# Patient Record
Sex: MALE | Race: WHITE | Employment: FULL TIME | URBAN - METROPOLITAN AREA
[De-identification: names, ages, dates, MRNs, and addresses within clinical notes are randomized per-mention and may not be internally consistent; named-entity substitution may affect disease eponyms.]

---

## 2018-11-15 ENCOUNTER — OFFICE VISIT (OUTPATIENT)
Dept: FAMILY MEDICINE CLINIC | Facility: CLINIC | Age: 18
End: 2018-11-15
Payer: COMMERCIAL

## 2018-11-15 VITALS
BODY MASS INDEX: 44.59 KG/M2 | HEART RATE: 106 BPM | WEIGHT: 315 LBS | SYSTOLIC BLOOD PRESSURE: 146 MMHG | HEIGHT: 70.5 IN | DIASTOLIC BLOOD PRESSURE: 87 MMHG | TEMPERATURE: 98 F

## 2018-11-15 DIAGNOSIS — N50.89 TESTICULAR MASS: Primary | ICD-10-CM

## 2018-11-15 PROCEDURE — 99212 OFFICE O/P EST SF 10 MIN: CPT | Performed by: FAMILY MEDICINE

## 2018-11-15 PROCEDURE — 99214 OFFICE O/P EST MOD 30 MIN: CPT | Performed by: FAMILY MEDICINE

## 2018-11-15 NOTE — PROGRESS NOTES
HPI:    Patient ID: Sharon Min is a 19year old male. HPI  Patient presents with:  Lump: patient c/o lump on testicle  Has noticed this over the past 2 months. Review of Systems   Constitutional: Negative.     Genitourinary: Positive for testicu

## 2018-12-14 ENCOUNTER — HOSPITAL ENCOUNTER (OUTPATIENT)
Dept: ULTRASOUND IMAGING | Age: 18
Discharge: HOME | End: 2018-12-14
Attending: FAMILY MEDICINE
Payer: COMMERCIAL

## 2018-12-14 DIAGNOSIS — N50.89 TESTICULAR MASS: ICD-10-CM

## 2018-12-14 PROCEDURE — 93975 VASCULAR STUDY: CPT | Performed by: FAMILY MEDICINE

## 2018-12-14 PROCEDURE — 76870 US EXAM SCROTUM: CPT | Performed by: FAMILY MEDICINE

## 2019-06-14 ENCOUNTER — OFFICE VISIT (OUTPATIENT)
Dept: FAMILY MEDICINE CLINIC | Facility: CLINIC | Age: 19
End: 2019-06-14
Payer: COMMERCIAL

## 2019-06-14 VITALS
TEMPERATURE: 98 F | DIASTOLIC BLOOD PRESSURE: 60 MMHG | HEART RATE: 94 BPM | WEIGHT: 315 LBS | SYSTOLIC BLOOD PRESSURE: 93 MMHG | BODY MASS INDEX: 44.59 KG/M2 | HEIGHT: 70.5 IN

## 2019-06-14 DIAGNOSIS — J01.00 ACUTE MAXILLARY SINUSITIS, RECURRENCE NOT SPECIFIED: Primary | ICD-10-CM

## 2019-06-14 PROCEDURE — 99213 OFFICE O/P EST LOW 20 MIN: CPT | Performed by: FAMILY MEDICINE

## 2019-06-14 PROCEDURE — 99212 OFFICE O/P EST SF 10 MIN: CPT | Performed by: FAMILY MEDICINE

## 2019-06-14 RX ORDER — AMOXICILLIN AND CLAVULANATE POTASSIUM 875; 125 MG/1; MG/1
1 TABLET, FILM COATED ORAL 2 TIMES DAILY
Qty: 14 TABLET | Refills: 0 | Status: SHIPPED | OUTPATIENT
Start: 2019-06-14 | End: 2019-06-21

## 2019-06-14 NOTE — PROGRESS NOTES
HPI:    Patient ID: Celeste Tomlinson. is a 19year old male. HPI  Patient presents with:  Cold: congestion head chest, chills, hard to breath, not sure if fever     Review of Systems   Constitutional: Positive for fatigue.    HENT: Positive for conges

## 2020-10-29 ENCOUNTER — NURSE TRIAGE (OUTPATIENT)
Dept: FAMILY MEDICINE CLINIC | Facility: CLINIC | Age: 20
End: 2020-10-29

## 2020-10-29 ENCOUNTER — TELEMEDICINE (OUTPATIENT)
Dept: FAMILY MEDICINE CLINIC | Facility: CLINIC | Age: 20
End: 2020-10-29
Payer: COMMERCIAL

## 2020-10-29 DIAGNOSIS — Z20.822 SUSPECTED COVID-19 VIRUS INFECTION: Primary | ICD-10-CM

## 2020-10-29 PROCEDURE — 99213 OFFICE O/P EST LOW 20 MIN: CPT | Performed by: PHYSICIAN ASSISTANT

## 2020-10-29 RX ORDER — AZITHROMYCIN 250 MG/1
TABLET, FILM COATED ORAL
Qty: 6 TABLET | Refills: 0 | Status: SHIPPED | OUTPATIENT
Start: 2020-10-29 | End: 2020-11-03

## 2020-10-29 NOTE — PROGRESS NOTES
Please note that the following visit was completed using two-way, real-time interactive audio and/or video communication.   This has been done in good suleman to provide continuity of care in the best interest of the provider-patient relationship, due to the The patient was made aware of where to find [facility] notice of privacy practices, telehealth consent form and other related consent forms and documents. which are located on the [facility] website.  The patient verbally agreed to telehealth consent form, related c disorder)          PHYSICAL EXAM:     Vitals signs taken at home if available:    Limited examination for this telephone visit due to the coronavirus emergency    Patient was speaking in complete sentences, no increased work of breathing and very coherent

## 2020-10-29 NOTE — TELEPHONE ENCOUNTER
Action Requested: Summary for Provider     []  Critical Lab, Recommendations Needed  [] Need Additional Advice  []   FYI    []   Need Orders  [] Need Medications Sent to Pharmacy  []  Other     SUMMARY: Patient reports intermittent symptoms of cold/cough/c

## 2020-10-30 ENCOUNTER — APPOINTMENT (OUTPATIENT)
Dept: LAB | Age: 20
End: 2020-10-30
Attending: PHYSICIAN ASSISTANT
Payer: COMMERCIAL

## 2020-10-30 DIAGNOSIS — Z20.822 SUSPECTED COVID-19 VIRUS INFECTION: ICD-10-CM

## 2022-04-13 ENCOUNTER — TELEPHONE (OUTPATIENT)
Dept: FAMILY MEDICINE CLINIC | Facility: CLINIC | Age: 22
End: 2022-04-13

## 2022-04-13 NOTE — TELEPHONE ENCOUNTER
Patient requesting orders for labs in regards to diabetes. Patient requesting a call back with any fasting instructions, and if this can be done prior to his appointment or day of.

## 2022-04-15 ENCOUNTER — OFFICE VISIT (OUTPATIENT)
Dept: FAMILY MEDICINE CLINIC | Facility: CLINIC | Age: 22
End: 2022-04-15
Payer: COMMERCIAL

## 2022-04-15 ENCOUNTER — LAB ENCOUNTER (OUTPATIENT)
Dept: LAB | Age: 22
End: 2022-04-15
Attending: FAMILY MEDICINE
Payer: COMMERCIAL

## 2022-04-15 VITALS
HEART RATE: 93 BPM | BODY MASS INDEX: 44.59 KG/M2 | DIASTOLIC BLOOD PRESSURE: 77 MMHG | HEIGHT: 70.5 IN | WEIGHT: 315 LBS | SYSTOLIC BLOOD PRESSURE: 124 MMHG

## 2022-04-15 DIAGNOSIS — E11.9 TYPE 2 DIABETES MELLITUS WITHOUT COMPLICATION, WITHOUT LONG-TERM CURRENT USE OF INSULIN (HCC): ICD-10-CM

## 2022-04-15 DIAGNOSIS — G47.33 OSA ON CPAP: ICD-10-CM

## 2022-04-15 DIAGNOSIS — Z00.00 ROUTINE GENERAL MEDICAL EXAMINATION AT A HEALTH CARE FACILITY: ICD-10-CM

## 2022-04-15 DIAGNOSIS — R73.09 ELEVATED GLUCOSE: Primary | ICD-10-CM

## 2022-04-15 DIAGNOSIS — Z99.89 OSA ON CPAP: ICD-10-CM

## 2022-04-15 DIAGNOSIS — Z00.00 ROUTINE GENERAL MEDICAL EXAMINATION AT A HEALTH CARE FACILITY: Primary | ICD-10-CM

## 2022-04-15 DIAGNOSIS — E66.01 CLASS 3 SEVERE OBESITY DUE TO EXCESS CALORIES WITHOUT SERIOUS COMORBIDITY WITH BODY MASS INDEX (BMI) OF 60.0 TO 69.9 IN ADULT (HCC): ICD-10-CM

## 2022-04-15 LAB
ALBUMIN SERPL-MCNC: 3.9 G/DL (ref 3.4–5)
ALBUMIN/GLOB SERPL: 1 {RATIO} (ref 1–2)
ALP LIVER SERPL-CCNC: 79 U/L
ALT SERPL-CCNC: 45 U/L
ANION GAP SERPL CALC-SCNC: 7 MMOL/L (ref 0–18)
AST SERPL-CCNC: 17 U/L (ref 15–37)
BASOPHILS # BLD AUTO: 0.09 X10(3) UL (ref 0–0.2)
BASOPHILS NFR BLD AUTO: 1.4 %
BILIRUB SERPL-MCNC: 0.5 MG/DL (ref 0.1–2)
BUN BLD-MCNC: 19 MG/DL (ref 7–18)
BUN/CREAT SERPL: 19 (ref 10–20)
CALCIUM BLD-MCNC: 9.4 MG/DL (ref 8.5–10.1)
CARTRIDGE LOT#: ABNORMAL NUMERIC
CHLORIDE SERPL-SCNC: 108 MMOL/L (ref 98–112)
CHOLEST SERPL-MCNC: 223 MG/DL (ref ?–200)
CO2 SERPL-SCNC: 24 MMOL/L (ref 21–32)
CREAT BLD-MCNC: 1 MG/DL
CREAT UR-SCNC: 243 MG/DL
DEPRECATED RDW RBC AUTO: 37.2 FL (ref 35.1–46.3)
EOSINOPHIL # BLD AUTO: 0.22 X10(3) UL (ref 0–0.7)
EOSINOPHIL NFR BLD AUTO: 3.5 %
ERYTHROCYTE [DISTWIDTH] IN BLOOD BY AUTOMATED COUNT: 12 % (ref 11–15)
FASTING PATIENT LIPID ANSWER: YES
FASTING STATUS PATIENT QL REPORTED: YES
GLOBULIN PLAS-MCNC: 3.9 G/DL (ref 2.8–4.4)
GLUCOSE BLD-MCNC: 168 MG/DL (ref 70–99)
HCT VFR BLD AUTO: 44.4 %
HDLC SERPL-MCNC: 42 MG/DL (ref 40–59)
HEMOGLOBIN A1C: 7.9 % (ref 4.3–5.6)
HGB BLD-MCNC: 14.7 G/DL
IMM GRANULOCYTES # BLD AUTO: 0.02 X10(3) UL (ref 0–1)
IMM GRANULOCYTES NFR BLD: 0.3 %
LDLC SERPL CALC-MCNC: 156 MG/DL (ref ?–100)
LYMPHOCYTES # BLD AUTO: 2.06 X10(3) UL (ref 1–4)
LYMPHOCYTES NFR BLD AUTO: 32.5 %
MCH RBC QN AUTO: 28.2 PG (ref 26–34)
MCHC RBC AUTO-ENTMCNC: 33.1 G/DL (ref 31–37)
MCV RBC AUTO: 85.2 FL
MICROALBUMIN UR-MCNC: 4.28 MG/DL
MICROALBUMIN/CREAT 24H UR-RTO: 17.6 UG/MG (ref ?–30)
MONOCYTES # BLD AUTO: 0.45 X10(3) UL (ref 0.1–1)
MONOCYTES NFR BLD AUTO: 7.1 %
NEUTROPHILS # BLD AUTO: 3.5 X10 (3) UL (ref 1.5–7.7)
NEUTROPHILS # BLD AUTO: 3.5 X10(3) UL (ref 1.5–7.7)
NEUTROPHILS NFR BLD AUTO: 55.2 %
NONHDLC SERPL-MCNC: 181 MG/DL (ref ?–130)
OSMOLALITY SERPL CALC.SUM OF ELEC: 294 MOSM/KG (ref 275–295)
PLATELET # BLD AUTO: 344 10(3)UL (ref 150–450)
POTASSIUM SERPL-SCNC: 4.2 MMOL/L (ref 3.5–5.1)
PROT SERPL-MCNC: 7.8 G/DL (ref 6.4–8.2)
RBC # BLD AUTO: 5.21 X10(6)UL
SODIUM SERPL-SCNC: 139 MMOL/L (ref 136–145)
TRIGL SERPL-MCNC: 140 MG/DL (ref 30–149)
VLDLC SERPL CALC-MCNC: 27 MG/DL (ref 0–30)
WBC # BLD AUTO: 6.3 X10(3) UL (ref 4–11)

## 2022-04-15 PROCEDURE — 80061 LIPID PANEL: CPT

## 2022-04-15 PROCEDURE — 3051F HG A1C>EQUAL 7.0%<8.0%: CPT | Performed by: FAMILY MEDICINE

## 2022-04-15 PROCEDURE — 99395 PREV VISIT EST AGE 18-39: CPT | Performed by: FAMILY MEDICINE

## 2022-04-15 PROCEDURE — 80053 COMPREHEN METABOLIC PANEL: CPT | Performed by: FAMILY MEDICINE

## 2022-04-15 PROCEDURE — 3008F BODY MASS INDEX DOCD: CPT | Performed by: FAMILY MEDICINE

## 2022-04-15 PROCEDURE — 99213 OFFICE O/P EST LOW 20 MIN: CPT | Performed by: FAMILY MEDICINE

## 2022-04-15 PROCEDURE — 3078F DIAST BP <80 MM HG: CPT | Performed by: FAMILY MEDICINE

## 2022-04-15 PROCEDURE — 36415 COLL VENOUS BLD VENIPUNCTURE: CPT | Performed by: FAMILY MEDICINE

## 2022-04-15 PROCEDURE — 85025 COMPLETE CBC W/AUTO DIFF WBC: CPT | Performed by: FAMILY MEDICINE

## 2022-04-15 PROCEDURE — 82043 UR ALBUMIN QUANTITATIVE: CPT | Performed by: FAMILY MEDICINE

## 2022-04-15 PROCEDURE — 3074F SYST BP LT 130 MM HG: CPT | Performed by: FAMILY MEDICINE

## 2022-04-15 PROCEDURE — 3061F NEG MICROALBUMINURIA REV: CPT | Performed by: FAMILY MEDICINE

## 2022-04-15 PROCEDURE — 82570 ASSAY OF URINE CREATININE: CPT | Performed by: FAMILY MEDICINE

## 2022-04-15 PROCEDURE — 83036 HEMOGLOBIN GLYCOSYLATED A1C: CPT | Performed by: FAMILY MEDICINE

## 2022-04-15 NOTE — PROGRESS NOTES
Subjective:   Patient ID: Martine Bethea. is a 19year old male. HPI  Patient presents with:  Physical: needs diabetes check for DOT. has been trying to lose weight   Derm Problem: lump on back, has had for years. not painful     History/Other:    Patient Active Problem List:     Acquired acanthosis nigricans     Attention deficit disorder with hyperactivity     Obesity    Review of Systems   Constitutional: Negative. HENT: Negative. Eyes: Negative. Respiratory: Negative. Cardiovascular: Negative. Gastrointestinal: Negative. Genitourinary: Negative. Musculoskeletal: Negative. Skin: Negative. Neurological: Negative. Psychiatric/Behavioral: Negative. No current outpatient medications on file. Allergies:  Artichoke               HIVES    Objective:   Physical Exam  Vitals reviewed. Constitutional:       Appearance: He is well-developed. He is obese. HENT:      Head: Normocephalic and atraumatic. Right Ear: External ear normal.      Left Ear: External ear normal.      Nose: Nose normal.   Eyes:      Conjunctiva/sclera: Conjunctivae normal.      Pupils: Pupils are equal, round, and reactive to light. Cardiovascular:      Rate and Rhythm: Normal rate and regular rhythm. Heart sounds: Normal heart sounds. Pulmonary:      Effort: Pulmonary effort is normal.      Breath sounds: Normal breath sounds. Abdominal:      General: Bowel sounds are normal.      Palpations: Abdomen is soft. Musculoskeletal:         General: Normal range of motion. Cervical back: Normal range of motion and neck supple. Skin:     General: Skin is warm and dry. Findings: No rash. Neurological:      Mental Status: He is alert and oriented to person, place, and time. Deep Tendon Reflexes: Reflexes are normal and symmetric. Psychiatric:         Mood and Affect: Mood is not anxious or depressed. Last A1c value was 7.9% done 4/15/2022.     Assessment & Plan: Elevated glucose  (primary encounter diagnosis)  Routine general medical examination at a health care facility  Type 2 diabetes mellitus without complication, without long-term current use of insulin (HCC)  ОЛЬГА on CPAP  Class 3 severe obesity due to excess calories without serious comorbidity with body mass index (BMI) of 60.0 to 69.9 in adult [facility])    Labs orderd. Start diabetic education new diagnosis of DM  Uses CPAP regularly. Discussed health aspects of his weight  Follow up in 2 months.      Orders Placed This Encounter      POC Glycohemoglobin [[id number]]      CBC With Differential With Platelet      Comp Metabolic Panel (14)      Microalb/Creat Ratio, Random Urine      Meds This Visit:  Requested Prescriptions      No prescriptions requested or ordered in this encounter       Imaging & Referrals:  DIABETIC EDUCATION - INTERNAL

## 2022-04-15 NOTE — PROGRESS NOTES
Subjective:   Patient ID: Anila Mehta. is a 19year old male. HPI  Patient presents with:  Physical: needs diabetes check for DOT. has been trying to lose weight   Derm Problem: lump on back, has had for years. not painful     History/Other:   Review of Systems   Constitutional: Negative. HENT: Negative. Eyes: Negative. Respiratory: Negative. Cardiovascular: Negative. Gastrointestinal: Negative. Genitourinary: Negative. Musculoskeletal: Negative. Skin: Negative. Neurological: Negative. Psychiatric/Behavioral: Negative. No current outpatient medications on file. Allergies:  Artichoke               HIVES    Objective:   Physical Exam  Vitals reviewed. Constitutional:       Appearance: He is well-developed. He is obese. HENT:      Head: Normocephalic and atraumatic. Right Ear: External ear normal.      Left Ear: External ear normal.      Nose: Nose normal.   Eyes:      Conjunctiva/sclera: Conjunctivae normal.      Pupils: Pupils are equal, round, and reactive to light. Cardiovascular:      Rate and Rhythm: Normal rate and regular rhythm. Heart sounds: Normal heart sounds. Pulmonary:      Effort: Pulmonary effort is normal.      Breath sounds: Normal breath sounds. Abdominal:      General: Bowel sounds are normal.      Palpations: Abdomen is soft. Musculoskeletal:         General: Normal range of motion. Cervical back: Normal range of motion and neck supple. Skin:     General: Skin is warm and dry. Findings: No rash. Neurological:      Mental Status: He is alert and oriented to person, place, and time. Deep Tendon Reflexes: Reflexes are normal and symmetric. Psychiatric:         Mood and Affect: Mood is not anxious or depressed.          Assessment & Plan:   Elevated glucose  (primary encounter diagnosis)  Routine general medical examination at a health care facility  Type 2 diabetes mellitus without complication, without long-term current use of insulin (HCC)  ОЛЬГА on CPAP    Orders Placed This Encounter      POC Glycohemoglobin [[id number]]      CBC With Differential With Platelet      Comp Metabolic Panel (14)      Microalb/Creat Ratio, Random Urine      Meds This Visit:  Requested Prescriptions      No prescriptions requested or ordered in this encounter       Imaging & Referrals:  DIABETIC EDUCATION - INTERNAL

## 2022-04-22 ENCOUNTER — HOSPITAL ENCOUNTER (OUTPATIENT)
Dept: ENDOCRINOLOGY | Facility: HOSPITAL | Age: 22
Discharge: HOME | End: 2022-04-22
Attending: FAMILY MEDICINE
Payer: COMMERCIAL

## 2022-04-22 VITALS — WEIGHT: 315 LBS | BODY MASS INDEX: 60 KG/M2

## 2022-04-22 DIAGNOSIS — E11.65 TYPE 2 DIABETES MELLITUS WITH HYPERGLYCEMIA, WITHOUT LONG-TERM CURRENT USE OF INSULIN (HCC): Primary | ICD-10-CM

## 2022-04-22 NOTE — PROGRESS NOTES
Isabella Márquez. : 11/10/2000 attended individual initial assessment for Diabetes Education:    Date: 2022   Start time: 3:30 pm End time: 4:30     HEMOGLOBIN A1C (%)   Date Value   04/15/2022 7.9 (A)        Assessment: 20 y/o male presents for DSMT education, he is motivated to lower AIC as well as losing weight     Diet Hx: B:cereal or a burger  L: fast food  D: Dio with meat and some starch  Has sufficient H20    Education provided on the below topics:     Diabetes Overview, pathophysiology, A1C results and treatment options for diabetes self-management:   Described basic process and treatment options for diabetes self-management. Healthy Eating:  Obtained usual diet history. Instructed on Basic Diet Guidelines which includes eating 3 meals/day with HS snack. Eat moderate amounts at consistent times from day to day. Limit/avoid sweets. Being Active: Encouraged Physical Activity    Taking Medications: Reviewed current diabetes medications (if applicable)    Monitoring: Instructed/reinforced blood glucose monitoring, testing schedules and target goals:   Fasting / Pre-meal  mg/dL 2 Hour Post-prandial <180 mg/dL  Demonstrated ability to perform blood glucose testing. Problem Solving: Prevention, detection and treatment of acute complications: taught symptoms of hypoglycemia, hyperglycemia, how to treat low blood sugar (Rule of 15) and actions for lowering high blood glucose levels. Initiated goal setting process and will continue to refine throughout class series. Recommendations:      Monitor blood glucose as directed. Follow Basic Diet Guidelines. Requesting order for 1:1 classes due to work schedule     Prescriptions sent to preferred pharmacy for blood glucose meter, test strips and lancets per protocol. Written materials provided for all areas covered. Patient verbalized understanding and has no further questions at this time.     Sailaja Wayne, RN

## 2022-04-29 ENCOUNTER — HOSPITAL ENCOUNTER (OUTPATIENT)
Dept: ENDOCRINOLOGY | Facility: HOSPITAL | Age: 22
Discharge: HOME | End: 2022-04-29
Attending: FAMILY MEDICINE
Payer: COMMERCIAL

## 2022-04-29 VITALS — BODY MASS INDEX: 60 KG/M2 | WEIGHT: 315 LBS

## 2022-04-29 DIAGNOSIS — E11.9 TYPE 2 DIABETES MELLITUS WITHOUT COMPLICATION, WITHOUT LONG-TERM CURRENT USE OF INSULIN (HCC): Primary | ICD-10-CM

## 2022-04-29 NOTE — PROGRESS NOTES
Elliott Mcneill. [id number] attended Steps 2 & 3 Individual Diabetes Education Class:1:1    Date: 4/29/2022  Start time:2:15  End time: 3:15    Wt: Wt Readings from Last 1 Encounters:  04/22/22 : (!) 425 lb        Blood Glucose: Not controlled: Progressing toward goal Will start to check sugars tommorow, just picked up glucometer and supplies      Healthy Eating:  Reviewed Basic Diet Guidelines as foundation of diabetes meal planning. Reinforced the balanced plate method. Taught nutrition basics defining carbohydrate, protein, and fat. Taught label reading, including an option to count carbohydrate grams or servings. Provided patient with goals for carbohydrate grams/choices for meals and snacks. Discussed sugar substitutes, ETOH and effect on blood glucose. Jose D's helpful for smart phones, as well as websites for examining carbohydrate levels provided. Reviewed and reinforced macronutrient's, carbohydrate counting and meal planning. Problem Solving:  Reinforced signs, symptoms, and treatment of hypoglycemia using the Rule of 15. Discussed impact of different food items and portion sizes on blood glucose levels. Discussed blood glucose target of 180 mg/dL, if testing 2 hours post meal.    Monitoring:  Reviewed blood glucose records and importance of tracking foods/blood glucose levels. Reinforced the importance of taking medications as prescribed. Behavior Change:  Reviewed and updated individual goals and action plan set by patient. Addressed barriers to tracking foods/blood glucose levels and following guidelines presented/achieving goals, as a group discussion. Recommendations: Follow individual meal plan recommended by Educator (Leo Montelongo). Continue implementing individual goals. Begin implementing carbohydrate counting and continue dietary and blood glucose tracking. Written materials provided for all areas covered.     Patient verbalized understanding and has no further questions currently     Danna Clark RN,MSN

## 2022-05-27 ENCOUNTER — APPOINTMENT (OUTPATIENT)
Dept: ENDOCRINOLOGY | Age: 22
End: 2022-05-27
Attending: FAMILY MEDICINE
Payer: COMMERCIAL

## 2022-06-10 ENCOUNTER — HOSPITAL ENCOUNTER (OUTPATIENT)
Dept: ENDOCRINOLOGY | Age: 22
Discharge: HOME | End: 2022-06-10
Attending: FAMILY MEDICINE
Payer: COMMERCIAL

## 2022-06-10 NOTE — PROGRESS NOTES
Bruce Leventhal. [id number] attended Step 4 Group Diabetes Education Class: 1;1   Date: 6/10/2022  Start time: 3:15 End time: 4:15     Wt: Wt Readings from Last 1 Encounters:  04/29/22 : (!) 425 lb    Weight change since start of program: lost 10 lbs    Blood Glucose: Not controlled: Progressing toward goal. Patient forgets to check BG but will     Healthy Eating  Reviewed and reinforced macronutrients, carbohydrate counting, and meal planning. Reviewed meal planning methods. Taught principles of heart healthy eating (fats, cholesterol, fiber, and sodium intake). Discussed Mediterranean meal planning. Taught label guidelines for choosing fat controlled proteins, snacks and desserts. Discussed how to integrate information for meal planning. Medication   Guidelines for cholesterol medications for persons with Diabetes. Healthy Coping  Instructed on developing and implementing a support plan. Discussed avoiding Diabetes burnout, dealing with stress and stress reduction techniques. Discussed recognizing and dealing with depression and diabetes. Being Active  Reinforced the benefits of exercise and safety precautions. Discussed the effects of blood glucose levels and role in weight loss. Discussed strategies for increasing activity and maintaining regular exercise regimen. Problem Solving  List of community resources provided and discussed. Behavior Change  Reviewed and updated individual goals set by patient. Action plan completed and Diabetes support plan initiated    Patient has identified the following goal(s) and actions related to: Healthy Eating, Being Active and Monitoring Blood Glucose  Diabetes goal assessment and action plan scanned into Media  Bruce Leventhal. has chosen the following ongoing Diabetes Support Plan: Exercise programs and The use of Diabetes Websites or Apps    Written materials provided for all areas covered.     Patient verbalized understanding and has no further questions at this time.  Patient will meet with educator in one month for Step 966 Darlene Perkins, RN,MSN

## 2022-06-24 ENCOUNTER — OFFICE VISIT (OUTPATIENT)
Dept: FAMILY MEDICINE CLINIC | Facility: CLINIC | Age: 22
End: 2022-06-24
Payer: COMMERCIAL

## 2022-06-24 VITALS
SYSTOLIC BLOOD PRESSURE: 133 MMHG | BODY MASS INDEX: 44.59 KG/M2 | HEART RATE: 120 BPM | HEIGHT: 70.5 IN | DIASTOLIC BLOOD PRESSURE: 74 MMHG | WEIGHT: 315 LBS

## 2022-06-24 DIAGNOSIS — E11.9 TYPE 2 DIABETES MELLITUS WITHOUT COMPLICATION, WITHOUT LONG-TERM CURRENT USE OF INSULIN (HCC): Primary | ICD-10-CM

## 2022-06-24 PROCEDURE — 3078F DIAST BP <80 MM HG: CPT | Performed by: FAMILY MEDICINE

## 2022-06-24 PROCEDURE — 3075F SYST BP GE 130 - 139MM HG: CPT | Performed by: FAMILY MEDICINE

## 2022-06-24 PROCEDURE — 3008F BODY MASS INDEX DOCD: CPT | Performed by: FAMILY MEDICINE

## 2022-06-24 PROCEDURE — 99213 OFFICE O/P EST LOW 20 MIN: CPT | Performed by: FAMILY MEDICINE

## 2022-07-15 ENCOUNTER — HOSPITAL ENCOUNTER (OUTPATIENT)
Dept: ENDOCRINOLOGY | Age: 22
Discharge: HOME | End: 2022-07-15
Attending: FAMILY MEDICINE
Payer: COMMERCIAL

## 2022-07-15 DIAGNOSIS — E11.65 TYPE 2 DIABETES MELLITUS WITH HYPERGLYCEMIA, WITHOUT LONG-TERM CURRENT USE OF INSULIN (HCC): Primary | ICD-10-CM

## 2022-07-15 NOTE — PROGRESS NOTES
Santos Balbuena. [id number] attended Step 5 Individual Diabetes Education Class: 1:1 due to work schedule. Order obtained from PCP for 1:1 education     Date: 7/15/2022  Start time: 2:15  End time: 3:15    Wt: Wt Readings from Last 1 Encounters:  06/24/22 : (!) 414 lb    Weight change since start of program: lost 5 lbs     POC HbA1c: 5.8 (much lower)    Blood Glucose: Controlled: Stable    Healthy Eating  Reviewed and reinforced macronutrients, carbohydrate counting, and meal planning. Reviewed meal planning methods. Taught principles of heart healthy eating (fats, cholesterol, fiber, and sodium intake). Discussed Mediterranean meal planning. Taught label guidelines for choosing fat controlled proteins, snacks and desserts. Discussed how to integrate information for meal planning. Medication   Guidelines for cholesterol medications for persons with Diabetes. Healthy Coping  Instructed on developing and implementing a support plan. Discussed avoiding Diabetes burnout, dealing with stress and stress reduction techniques. Discussed recognizing and dealing with depression and diabetes. Being Active  Reinforced the benefits of exercise and safety precautions. Discussed the effects of blood glucose levels and role in weight loss. Discussed strategies for increasing activity and maintaining regular exercise regimen. Problem Solving  List of community resources provided and discussed. Behavior Change  Reviewed and updated individual goals set by patient.  Action plan completed and Diabetes support plan initiated    Patient has identified the following goal(s) and actions related to: Healthy Eating, Being Active and Monitoring Blood Glucose  Diabetes goal assessment and action plan scanned into Media  Santos Balbuena. has chosen the following ongoing Diabetes Support Plan: [facility] Management programs, Exercise programs and The use of Diabetes Websites or Apps    Written materials provided for all areas covered. Patient verbalized understanding and has no further questions at this time.      Dulce Escobedo, RN, MSN

## 2024-05-03 ENCOUNTER — TELEPHONE (OUTPATIENT)
Dept: FAMILY MEDICINE CLINIC | Facility: CLINIC | Age: 24
End: 2024-05-03

## 2025-01-28 ENCOUNTER — OFFICE VISIT (OUTPATIENT)
Dept: FAMILY MEDICINE CLINIC | Facility: CLINIC | Age: 25
End: 2025-01-28

## 2025-01-28 ENCOUNTER — LAB ENCOUNTER (OUTPATIENT)
Dept: LAB | Age: 25
End: 2025-01-28
Attending: FAMILY MEDICINE
Payer: COMMERCIAL

## 2025-01-28 VITALS
WEIGHT: 315 LBS | DIASTOLIC BLOOD PRESSURE: 88 MMHG | BODY MASS INDEX: 59 KG/M2 | OXYGEN SATURATION: 98 % | SYSTOLIC BLOOD PRESSURE: 150 MMHG | HEART RATE: 95 BPM

## 2025-01-28 DIAGNOSIS — G47.33 OSA ON CPAP: ICD-10-CM

## 2025-01-28 DIAGNOSIS — E66.01 CLASS 3 SEVERE OBESITY DUE TO EXCESS CALORIES WITHOUT SERIOUS COMORBIDITY WITH BODY MASS INDEX (BMI) OF 60.0 TO 69.9 IN ADULT (HCC): ICD-10-CM

## 2025-01-28 DIAGNOSIS — E11.9 TYPE 2 DIABETES MELLITUS WITHOUT COMPLICATION, WITHOUT LONG-TERM CURRENT USE OF INSULIN (HCC): ICD-10-CM

## 2025-01-28 DIAGNOSIS — E66.813 CLASS 3 SEVERE OBESITY DUE TO EXCESS CALORIES WITHOUT SERIOUS COMORBIDITY WITH BODY MASS INDEX (BMI) OF 60.0 TO 69.9 IN ADULT (HCC): ICD-10-CM

## 2025-01-28 DIAGNOSIS — Z00.00 ROUTINE GENERAL MEDICAL EXAMINATION AT A HEALTH CARE FACILITY: Primary | ICD-10-CM

## 2025-01-28 LAB
ALBUMIN SERPL-MCNC: 4.8 G/DL (ref 3.2–4.8)
ALBUMIN/GLOB SERPL: 1.5 {RATIO} (ref 1–2)
ALP LIVER SERPL-CCNC: 81 U/L
ALT SERPL-CCNC: 58 U/L
ANION GAP SERPL CALC-SCNC: 11 MMOL/L (ref 0–18)
AST SERPL-CCNC: 25 U/L (ref ?–34)
BASOPHILS # BLD AUTO: 0.09 X10(3) UL (ref 0–0.2)
BASOPHILS NFR BLD AUTO: 1 %
BILIRUB SERPL-MCNC: 0.6 MG/DL (ref 0.3–1.2)
BUN BLD-MCNC: 14 MG/DL (ref 9–23)
BUN/CREAT SERPL: 15.4 (ref 10–20)
CALCIUM BLD-MCNC: 10.2 MG/DL (ref 8.7–10.4)
CHLORIDE SERPL-SCNC: 103 MMOL/L (ref 98–112)
CHOLEST SERPL-MCNC: 212 MG/DL (ref ?–200)
CO2 SERPL-SCNC: 24 MMOL/L (ref 21–32)
CREAT BLD-MCNC: 0.91 MG/DL
DEPRECATED RDW RBC AUTO: 37.5 FL (ref 35.1–46.3)
EGFRCR SERPLBLD CKD-EPI 2021: 119 ML/MIN/1.73M2 (ref 60–?)
EOSINOPHIL # BLD AUTO: 0.26 X10(3) UL (ref 0–0.7)
EOSINOPHIL NFR BLD AUTO: 2.9 %
ERYTHROCYTE [DISTWIDTH] IN BLOOD BY AUTOMATED COUNT: 12.4 % (ref 11–15)
EST. AVERAGE GLUCOSE BLD GHB EST-MCNC: 220 MG/DL (ref 68–126)
FASTING PATIENT LIPID ANSWER: NO
FASTING STATUS PATIENT QL REPORTED: NO
GLOBULIN PLAS-MCNC: 3.3 G/DL (ref 2–3.5)
GLUCOSE BLD-MCNC: 172 MG/DL (ref 70–99)
HBA1C MFR BLD: 9.3 % (ref ?–5.7)
HCT VFR BLD AUTO: 42.1 %
HDLC SERPL-MCNC: 46 MG/DL (ref 40–59)
HGB BLD-MCNC: 15 G/DL
IMM GRANULOCYTES # BLD AUTO: 0.03 X10(3) UL (ref 0–1)
IMM GRANULOCYTES NFR BLD: 0.3 %
LDLC SERPL CALC-MCNC: 140 MG/DL (ref ?–100)
LYMPHOCYTES # BLD AUTO: 2.88 X10(3) UL (ref 1–4)
LYMPHOCYTES NFR BLD AUTO: 31.8 %
MCH RBC QN AUTO: 29.9 PG (ref 26–34)
MCHC RBC AUTO-ENTMCNC: 35.6 G/DL (ref 31–37)
MCV RBC AUTO: 83.9 FL
MONOCYTES # BLD AUTO: 0.81 X10(3) UL (ref 0.1–1)
MONOCYTES NFR BLD AUTO: 8.9 %
NEUTROPHILS # BLD AUTO: 4.99 X10 (3) UL (ref 1.5–7.7)
NEUTROPHILS # BLD AUTO: 4.99 X10(3) UL (ref 1.5–7.7)
NEUTROPHILS NFR BLD AUTO: 55.1 %
NONHDLC SERPL-MCNC: 166 MG/DL (ref ?–130)
OSMOLALITY SERPL CALC.SUM OF ELEC: 291 MOSM/KG (ref 275–295)
PLATELET # BLD AUTO: 335 10(3)UL (ref 150–450)
POTASSIUM SERPL-SCNC: 3.5 MMOL/L (ref 3.5–5.1)
PROT SERPL-MCNC: 8.1 G/DL (ref 5.7–8.2)
RBC # BLD AUTO: 5.02 X10(6)UL
SODIUM SERPL-SCNC: 138 MMOL/L (ref 136–145)
TRIGL SERPL-MCNC: 143 MG/DL (ref 30–149)
TSI SER-ACNC: 2.11 UIU/ML (ref 0.55–4.78)
VLDLC SERPL CALC-MCNC: 27 MG/DL (ref 0–30)
WBC # BLD AUTO: 9.1 X10(3) UL (ref 4–11)

## 2025-01-28 PROCEDURE — 80061 LIPID PANEL: CPT | Performed by: FAMILY MEDICINE

## 2025-01-28 PROCEDURE — 85025 COMPLETE CBC W/AUTO DIFF WBC: CPT | Performed by: FAMILY MEDICINE

## 2025-01-28 PROCEDURE — 36415 COLL VENOUS BLD VENIPUNCTURE: CPT | Performed by: FAMILY MEDICINE

## 2025-01-28 PROCEDURE — 80053 COMPREHEN METABOLIC PANEL: CPT | Performed by: FAMILY MEDICINE

## 2025-01-28 PROCEDURE — 84443 ASSAY THYROID STIM HORMONE: CPT | Performed by: FAMILY MEDICINE

## 2025-01-28 PROCEDURE — 83036 HEMOGLOBIN GLYCOSYLATED A1C: CPT | Performed by: FAMILY MEDICINE

## 2025-01-28 PROCEDURE — 99395 PREV VISIT EST AGE 18-39: CPT | Performed by: FAMILY MEDICINE

## 2025-01-28 NOTE — PROGRESS NOTES
Subjective:   Tay Perea Jr. is a 24 year old male who presents for No chief complaint on file.       History/Other:   History of Present Illness  Tay Perea Jr. is a 24 year old male who presents for a routine physical exam and weight management.    His weight has been stable without recent increase, and he notes having more room on his belts, suggesting some weight loss. He is interested in weight management and is considering lifestyle changes such as consulting a dietician and finding a suitable bicycle for exercise.    He experiences slight pain in his hand, specifically when applying pressure or stretching his fingers back, attributed to an incident where he banged his hand out of frustration. No pain at rest and good range of motion in the hand.    He has a history of ADHD but is not currently seeing a doctor for it and is not on any medication. He uses CPAP therapy for sleep apnea, although it has been a while since the CPAP performance was checked.    He has not had his blood sugar or labs checked in a while, with a history of monitoring for diabetes. He expresses concern about the cost of lab tests with insurance but understands the necessity of checking his A1c and thyroid levels as part of his weight management plan.   No Further Nursing Notes to Review  Tobacco Reviewed  Allergies   Reviewed  Medications Reviewed  Problem List Reviewed  Medical History   Reviewed  Surgical History Reviewed  Family History Reviewed  Social   History Reviewed         Tobacco:  He has never smoked tobacco.    Current Outpatient Medications   Medication Sig Dispense Refill    Blood Glucose Monitoring Suppl (ONETOUCH VERIO FLEX SYSTEM) w/Device Does not apply Kit 1 kit in the morning, at noon, and at bedtime. May substitute per insurance formulary (Patient not taking: Reported on 1/28/2025) 1 kit 0       PHQ-2 SCORE: 0  , done 1/28/2025          Review of Systems:  Pertinent items are noted in HPI.  A  comprehensive review of systems was negative.      Objective:   /88   Pulse 95   Wt (!) 415 lb (188.2 kg)   SpO2 98%   BMI 58.70 kg/m²  Estimated body mass index is 58.7 kg/m² as calculated from the following:    Height as of 6/24/22: 5' 10.5\" (1.791 m).    Weight as of this encounter: 415 lb (188.2 kg).  Results       Physical Exam  MUSCULOSKELETAL: Good range of motion in the hand. No pain upon palpation. Mild swelling in the hand.  HEENT: normal.  Neck: Normal.  HRT - Normal LNG: normal Abd: normal.  Ext: mild edema.  Skin : clear. Neuro: normal.     Assessment & Plan:   1. Routine general medical examination at a health care facility (Primary)  -     CBC With Differential With Platelet  -     Comp Metabolic Panel ()  -     Lipid Panel  -     Assay, Thyroid Stim Hormone  2. Class 3 severe obesity due to excess calories without serious comorbidity with body mass index (BMI) of 60.0 to 69.9 in adult ([facility])  -     DIETITIAN EDUCATION INITIAL, DIET (INTERNAL)  3. Type 2 diabetes mellitus without complication, without long-term current use of insulin ([facility])  -     Hemoglobin A1C  4. ОЛЬГА on CPAP    Assessment & Plan  Obesity  Noted weight loss since last visit. Patient is planning to see a dietician and join a gym. Discussed the importance of regular exercise and a healthy diet.  -Referral to dietician.  -Encourage regular exercise and healthy diet.  -labs.   Possible Diabetes  Last A1c unknown, but patient was previously monitored for diabetes.  -Order labs including A1c, liver function, kidney function, and thyroid function.    Hand Pain  Pain with severe pressure and stretching fingers back. No pain with normal range of motion.  -Advise hand stretches to improve flexibility and reduce pain.    Sleep Apnea  Patient is on CPAP but performance measure has not been checked recently.  -Advise patient to contact [facility] for CPAP performance measure.    General Health Maintenance  -Complete physical exam  today.  -Check labs today.        No follow-ups on file.        Sen Garnica DO, 1/28/2025, 2:08 PM

## 2025-07-23 ENCOUNTER — TELEPHONE (OUTPATIENT)
Dept: FAMILY MEDICINE CLINIC | Facility: CLINIC | Age: 25
End: 2025-07-23

## (undated) NOTE — LETTER
4/15/2022              Jillian Pinon [address]         To Whom it may concern: This is to certify that Jillian Pinon had an appointment on 4/15/2022 at 11:34 AM with Timothy Varner DO. Office Hemoglobin A1C today is 7.9. He is being referred to Diabetic Education for nutritional counseling. He will return in 2 months to be reevaluated.        Sincerely,      Timothy Varner DO  [address], [place]  [address] [facility] [address]  [phone]        Document electronically generated by:  Timothy Varner DO

## (undated) NOTE — LETTER
6/14/2019              Vinita Bui. [address]         To Whom it may concern: This is to certify that Vinita Bui. had an appointment on 6/14/2019 at 3:04 PM with Catherine Nelson DO.   Off work to